# Patient Record
Sex: FEMALE | Race: BLACK OR AFRICAN AMERICAN | URBAN - METROPOLITAN AREA
[De-identification: names, ages, dates, MRNs, and addresses within clinical notes are randomized per-mention and may not be internally consistent; named-entity substitution may affect disease eponyms.]

---

## 2024-08-05 ENCOUNTER — OFFICE VISIT (OUTPATIENT)
Dept: ENDOCRINOLOGY | Age: 44
End: 2024-08-05
Payer: COMMERCIAL

## 2024-08-05 VITALS — DIASTOLIC BLOOD PRESSURE: 90 MMHG | SYSTOLIC BLOOD PRESSURE: 145 MMHG | WEIGHT: 243 LBS

## 2024-08-05 DIAGNOSIS — I49.9 IRREGULAR HEART RHYTHM: ICD-10-CM

## 2024-08-05 DIAGNOSIS — E05.90 HYPERTHYROIDISM: ICD-10-CM

## 2024-08-05 DIAGNOSIS — E05.00 THYROTOXICOSIS DUE TO GRAVES' DISEASE: Primary | ICD-10-CM

## 2024-08-05 PROBLEM — I10 ESSENTIAL HYPERTENSION: Status: ACTIVE | Noted: 2024-07-03

## 2024-08-05 PROBLEM — E28.2 PCOS (POLYCYSTIC OVARIAN SYNDROME): Status: ACTIVE | Noted: 2024-07-03

## 2024-08-05 PROCEDURE — 3077F SYST BP >= 140 MM HG: CPT | Performed by: STUDENT IN AN ORGANIZED HEALTH CARE EDUCATION/TRAINING PROGRAM

## 2024-08-05 PROCEDURE — 99205 OFFICE O/P NEW HI 60 MIN: CPT | Performed by: STUDENT IN AN ORGANIZED HEALTH CARE EDUCATION/TRAINING PROGRAM

## 2024-08-05 PROCEDURE — 3080F DIAST BP >= 90 MM HG: CPT | Performed by: STUDENT IN AN ORGANIZED HEALTH CARE EDUCATION/TRAINING PROGRAM

## 2024-08-05 RX ORDER — PROGESTERONE 200 MG/1
CAPSULE ORAL
COMMUNITY

## 2024-08-05 RX ORDER — AZITHROMYCIN 250 MG/1
TABLET, FILM COATED ORAL
COMMUNITY
Start: 2024-06-25

## 2024-08-05 RX ORDER — METHIMAZOLE 10 MG/1
10 TABLET ORAL 2 TIMES DAILY
COMMUNITY
Start: 2024-07-15

## 2024-08-05 RX ORDER — ATENOLOL 25 MG/1
25 TABLET ORAL
COMMUNITY
Start: 2024-07-15

## 2024-08-05 ASSESSMENT — ENCOUNTER SYMPTOMS
DIARRHEA: 0
TROUBLE SWALLOWING: 0
EYE REDNESS: 0
CHEST TIGHTNESS: 0
SHORTNESS OF BREATH: 0
SORE THROAT: 0
EYE PAIN: 0
NAUSEA: 0
CONSTIPATION: 0
ABDOMINAL PAIN: 0
VOICE CHANGE: 0
EYE ITCHING: 0

## 2024-08-05 NOTE — PROGRESS NOTES
progesterone (PROMETRIUM) 200 MG CAPS capsule TAKE ONE TABLET BY MOUTH EVERY EVENING FOR 12 DAYS of THE MONTH      methIMAzole (TAPAZOLE) 10 MG tablet Take 1 tablet by mouth 2 times daily       No current facility-administered medications for this visit.        Allergies    Allergies   Allergen Reactions    Penicillins Other (See Comments)        BP (!) 145/90 (Site: Left Upper Arm, Position: Sitting)   Wt 110.2 kg (243 lb)     BP Readings from Last 3 Encounters:   08/05/24 (!) 145/90       Wt Readings from Last 3 Encounters:   08/05/24 110.2 kg (243 lb)       Physical Exam  Constitutional:       General: She is not in acute distress.     Appearance: Normal appearance. She is not diaphoretic.   HENT:      Head: Normocephalic and atraumatic.      Mouth/Throat:      Mouth: Mucous membranes are moist.   Eyes:      Extraocular Movements: Extraocular movements intact.      Conjunctiva/sclera: Conjunctivae normal.      Pupils: Pupils are equal, round, and reactive to light.      Comments: No proptosis, lid lag or stare   Neck:      Thyroid: No thyroid mass, thyromegaly or thyroid tenderness.   Cardiovascular:      Rate and Rhythm: Normal rate. Rhythm irregular.      Heart sounds: No murmur heard.     No friction rub. No gallop.   Pulmonary:      Effort: Pulmonary effort is normal.      Breath sounds: Normal breath sounds.   Abdominal:      General: Abdomen is flat.      Palpations: Abdomen is soft.   Musculoskeletal:         General: No swelling or deformity.      Cervical back: Normal range of motion and neck supple. No tenderness.   Lymphadenopathy:      Cervical: No cervical adenopathy.   Skin:     General: Skin is warm and dry.      Findings: No rash.   Neurological:      General: No focal deficit present.      Mental Status: She is alert and oriented to person, place, and time. Mental status is at baseline.      Motor: No weakness.      Coordination: Coordination normal.      Gait: Gait normal.      Deep Tendon

## 2024-08-05 NOTE — ASSESSMENT & PLAN NOTE
Uncontrolled Graves disease, +TPO, TSI and TRAB  Started on Methimazole 10 mg daily 3 weeks ago.   Started Ateonolol 25 mg daily 1 month ago. SBP is 130s at home with HR in the 70s at rest. HR is 90 in office today with /90.  Recheck labs in 2 weeks, Lab de per patient request.  Titrate MMI based on labs.  Clinically euthyroid at present.  Discussed goals of treatment and potential side effects to medications.

## 2024-08-19 DIAGNOSIS — E05.90 HYPERTHYROIDISM: ICD-10-CM

## 2024-08-20 ENCOUNTER — TELEPHONE (OUTPATIENT)
Dept: ENDOCRINOLOGY | Age: 44
End: 2024-08-20

## 2024-08-20 DIAGNOSIS — E05.00 THYROTOXICOSIS DUE TO GRAVES' DISEASE: Primary | ICD-10-CM

## 2024-08-20 RX ORDER — METHIMAZOLE 10 MG/1
20 TABLET ORAL 2 TIMES DAILY
Qty: 180 TABLET | Refills: 4 | Status: SHIPPED | OUTPATIENT
Start: 2024-08-20 | End: 2025-08-15

## 2024-08-20 NOTE — TELEPHONE ENCOUNTER
Please call to let the patient know that I reviewed labs that were completed on 8/15/2024.  Only mild improvement in her thyroid function. Please confirm that she is compliant with methimazole 10 mg 3 times daily.  If she is taking as prescribed then please advise that she increase methimazole to 20 mg twice daily (2 tabs twice daily).  If she is not currently taking her methimazole or taking it appropriately, then please let me know.  According to her recent primary care note, she was not taking the medication.  If she is having concerns or questions, then I am happy to see her back sooner if that is helpful.

## 2024-08-22 ENCOUNTER — PATIENT MESSAGE (OUTPATIENT)
Dept: ENDOCRINOLOGY | Age: 44
End: 2024-08-22

## 2024-08-23 NOTE — TELEPHONE ENCOUNTER
Spoke to the Pt, she is so confused and wants to know what she needs to take. She was told 20 mg now to 40 mg daily. Please advise how you want her to take the methimazole?

## 2024-09-08 DIAGNOSIS — E05.90 HYPERTHYROIDISM: ICD-10-CM

## 2024-09-19 LAB
T3FREE SERPL-MCNC: 4.1 PG/ML (ref 2–4.4)
T4 FREE SERPL-MCNC: 1.58 NG/DL (ref 0.82–1.77)
TSH SERPL DL<=0.005 MIU/L-ACNC: <0.005 UIU/ML (ref 0.45–4.5)

## 2024-09-23 DIAGNOSIS — E05.00 THYROTOXICOSIS DUE TO GRAVES' DISEASE: ICD-10-CM

## 2024-09-23 RX ORDER — METHIMAZOLE 10 MG/1
20 TABLET ORAL DAILY
Qty: 120 TABLET | Refills: 4 | Status: SHIPPED | OUTPATIENT
Start: 2024-09-23 | End: 2025-09-18

## 2024-11-01 ENCOUNTER — OFFICE VISIT (OUTPATIENT)
Dept: ENDOCRINOLOGY | Age: 44
End: 2024-11-01
Payer: COMMERCIAL

## 2024-11-01 VITALS — WEIGHT: 246 LBS | DIASTOLIC BLOOD PRESSURE: 80 MMHG | SYSTOLIC BLOOD PRESSURE: 122 MMHG

## 2024-11-01 DIAGNOSIS — E05.00 THYROTOXICOSIS DUE TO GRAVES' DISEASE: Primary | ICD-10-CM

## 2024-11-01 PROCEDURE — 99214 OFFICE O/P EST MOD 30 MIN: CPT | Performed by: STUDENT IN AN ORGANIZED HEALTH CARE EDUCATION/TRAINING PROGRAM

## 2024-11-01 PROCEDURE — 3079F DIAST BP 80-89 MM HG: CPT | Performed by: STUDENT IN AN ORGANIZED HEALTH CARE EDUCATION/TRAINING PROGRAM

## 2024-11-01 PROCEDURE — 3074F SYST BP LT 130 MM HG: CPT | Performed by: STUDENT IN AN ORGANIZED HEALTH CARE EDUCATION/TRAINING PROGRAM

## 2024-11-01 RX ORDER — CHLORTHALIDONE 25 MG/1
25 TABLET ORAL EVERY MORNING
COMMUNITY
Start: 2024-10-25

## 2024-11-01 RX ORDER — METHIMAZOLE 5 MG/1
15 TABLET ORAL DAILY
Qty: 120 TABLET | Refills: 4 | Status: SHIPPED | OUTPATIENT
Start: 2024-11-01 | End: 2025-10-27

## 2024-11-01 ASSESSMENT — ENCOUNTER SYMPTOMS
VOICE CHANGE: 0
EYE REDNESS: 0
SHORTNESS OF BREATH: 0
ABDOMINAL PAIN: 0
DIARRHEA: 0
TROUBLE SWALLOWING: 0
EYE ITCHING: 0
CHEST TIGHTNESS: 0
NAUSEA: 0
SORE THROAT: 0
EYE PAIN: 0
CONSTIPATION: 0

## 2024-11-01 NOTE — PROGRESS NOTES
DO Luis Carlos Peck [facility]  [address], [address]  [place] [address]        Yarelis Fuentes is a 43 y.o. female who presents for follow up, evaluation and management of Hyperthyroidism.  LOV/est care 2024    ASSESSMENT AND PLAN:    1. Thyrotoxicosis due to Graves' disease  Overview:  Uncontrolled Graves disease, +TPO, TSI and TRAB  Methimazole was increased from 10 mg to 20 mg on 2024. Labs 2024 and 10/2024 with FT3 and FT4 in normal range. TSH remains undetectable.  Ateonolol stopped by PCP, noted increase in palpitations. Will discuss with PCP. Defer BP management.  Discussed goals of treatment and potential side effects to medications.  Assessment & Plan:  Decrease methimazole to 15 mg daily and recheck labs in 6 weeks.  Titrate MMI based on labs.  Orders:  -     TSH; Future  -     T4, Free; Future  -     T3, Free; Future  -     methIMAzole (TAPAZOLE) 5 MG tablet; Take 3 tablets by mouth daily, Disp-120 tablet, R-4Normal       History of Present Illness:  HYPERTHYROIDISM    Symptoms: See review of systems below  (+) elevated heart rate  (-) Eye symptoms, tremor, mood changes, sleep changes, GI  2018 she intentionally lost about 50 lbs. But she has been stable at her current weight.    Family History: The patient does have a family history of thyroid disorders.  Only aunt with thyroid cancer, hypothyroidism after thyroidectomy.    Medications:  Started Atenolol 24, initially 50 mg then decreased to 25 mg daily.   She started Methimazole 10 mg daily on 7/15/2024. She reports nausea with taking the medication, otherwise tolerating.   Counseled on side effects.  Increase to methimazole 20 mg daily 2024.  Based on labs today we will decrease methimazole dose to 15 mg daily.    Prior imagin Head and Neck US:  Thyromegaly. There is diffusely heterogenous echogenicity of the thyroid but no focal abnormality is seen.     Prior laboratory evaluation:  2024 TSH

## 2025-02-07 ENCOUNTER — OFFICE VISIT (OUTPATIENT)
Dept: ENDOCRINOLOGY | Age: 45
End: 2025-02-07
Payer: COMMERCIAL

## 2025-02-07 VITALS
DIASTOLIC BLOOD PRESSURE: 84 MMHG | HEIGHT: 70 IN | OXYGEN SATURATION: 98 % | SYSTOLIC BLOOD PRESSURE: 126 MMHG | BODY MASS INDEX: 37.08 KG/M2 | HEART RATE: 84 BPM | WEIGHT: 259 LBS

## 2025-02-07 DIAGNOSIS — E05.00 THYROTOXICOSIS DUE TO GRAVES' DISEASE: Primary | ICD-10-CM

## 2025-02-07 DIAGNOSIS — E05.90 HYPERTHYROIDISM: ICD-10-CM

## 2025-02-07 LAB
ALBUMIN SERPL-MCNC: 3.7 G/DL (ref 3.5–5)
ALBUMIN/GLOB SERPL: 0.8 (ref 1–1.9)
ALP SERPL-CCNC: 74 U/L (ref 35–104)
ALT SERPL-CCNC: 12 U/L (ref 8–45)
ANION GAP SERPL CALC-SCNC: 12 MMOL/L (ref 7–16)
AST SERPL-CCNC: 21 U/L (ref 15–37)
BILIRUB SERPL-MCNC: 0.9 MG/DL (ref 0–1.2)
BUN SERPL-MCNC: 14 MG/DL (ref 6–23)
CALCIUM SERPL-MCNC: 8.9 MG/DL (ref 8.8–10.2)
CHLORIDE SERPL-SCNC: 103 MMOL/L (ref 98–107)
CO2 SERPL-SCNC: 23 MMOL/L (ref 20–29)
CREAT SERPL-MCNC: 0.72 MG/DL (ref 0.6–1.1)
GLOBULIN SER CALC-MCNC: 4.5 G/DL (ref 2.3–3.5)
GLUCOSE SERPL-MCNC: 85 MG/DL (ref 70–99)
POTASSIUM SERPL-SCNC: 3.7 MMOL/L (ref 3.5–5.1)
PROT SERPL-MCNC: 8.2 G/DL (ref 6.3–8.2)
SODIUM SERPL-SCNC: 138 MMOL/L (ref 136–145)
T4 FREE SERPL-MCNC: 0.7 NG/DL (ref 0.9–1.7)
TSH, 3RD GENERATION: 1.69 UIU/ML (ref 0.27–4.2)

## 2025-02-07 PROCEDURE — 99214 OFFICE O/P EST MOD 30 MIN: CPT | Performed by: STUDENT IN AN ORGANIZED HEALTH CARE EDUCATION/TRAINING PROGRAM

## 2025-02-07 PROCEDURE — 3079F DIAST BP 80-89 MM HG: CPT | Performed by: STUDENT IN AN ORGANIZED HEALTH CARE EDUCATION/TRAINING PROGRAM

## 2025-02-07 PROCEDURE — 3074F SYST BP LT 130 MM HG: CPT | Performed by: STUDENT IN AN ORGANIZED HEALTH CARE EDUCATION/TRAINING PROGRAM

## 2025-02-07 RX ORDER — METHIMAZOLE 10 MG/1
10 TABLET ORAL DAILY
Qty: 90 TABLET | Refills: 1 | Status: SHIPPED | OUTPATIENT
Start: 2025-02-07 | End: 2026-02-02

## 2025-02-07 ASSESSMENT — ENCOUNTER SYMPTOMS
ABDOMINAL PAIN: 0
EYE REDNESS: 0
VOICE CHANGE: 0
SHORTNESS OF BREATH: 0
TROUBLE SWALLOWING: 0
DIARRHEA: 0
SORE THROAT: 0
EYE PAIN: 0
NAUSEA: 0
CONSTIPATION: 0
EYE ITCHING: 0
CHEST TIGHTNESS: 0

## 2025-02-07 NOTE — PROGRESS NOTES
DO Luis Carlos Peck [facility]  [address], [address]  [place] [address]        Yarelis Fuentes is a 44 y.o. female who presents for follow up, evaluation and management of Hyperthyroidism.  LOV/est care 8/5/2024  LOV 11/1/2024    NOTICE FOR THE PATIENT: This clinical note is not designed to be interpreted by patients.  We do not recommend reading it unless you have medical training. These notes may contain candid and (unintentionally) offensive descriptions, which are sometimes required for accurate documentation. If you would like more information about your healthcare, please obtain it directly by myself or my staff/colleagues - never solely from the notes. Thank you for your understanding and cooperation.    ASSESSMENT AND PLAN:    1. Thyrotoxicosis due to Graves' disease  Overview:  Uncontrolled Graves disease, +TPO, TSI and TRAB  Methimazole was increased from 10 mg to 20 mg on 8/2024. Labs 9/2024 and 10/2024 with FT3 and FT4 in normal range. TSH remains undetectable.  Decreased Methimazole to 15 mg daily 11/1/2024.  Assessment & Plan:  Reviewed labs from 12/12/2024, plan to get labs today, anticipate decreasing Methimazole dose.  Orders:  -     methIMAzole (TAPAZOLE) 10 MG tablet; Take 1 tablet by mouth daily, Disp-90 tablet, R-1Normal  2. Hyperthyroidism  -     T3, Free; Future  -     T4, Free; Future  -     TSH; Future  -     Comprehensive Metabolic Panel; Future  -     T3, Free; Future  -     T4, Free; Future  -     TSH; Future  -     methIMAzole (TAPAZOLE) 10 MG tablet; Take 1 tablet by mouth daily, Disp-90 tablet, R-1Normal       Follow-up and Dispositions    Return in about 3 months (around 5/7/2025) for Hyperthyroidism.           History of Present Illness:  HYPERTHYROIDISM    Symptoms: See review of systems below  (+) elevated heart rate  (-) Eye symptoms, tremor, mood changes, sleep changes, GI  2018 she intentionally lost about 50 lbs. But she has been stable at her current

## 2025-02-09 LAB — T3FREE SERPL-MCNC: 2.2 PG/ML (ref 2–4.4)

## 2025-03-06 DIAGNOSIS — E05.90 HYPERTHYROIDISM: ICD-10-CM

## 2025-03-06 DIAGNOSIS — E05.00 THYROTOXICOSIS DUE TO GRAVES' DISEASE: ICD-10-CM

## 2025-03-06 DIAGNOSIS — E05.90 HYPERTHYROIDISM: Primary | ICD-10-CM

## 2025-03-06 RX ORDER — METHIMAZOLE 5 MG/1
5 TABLET ORAL DAILY
Qty: 30 TABLET | Refills: 1 | Status: SHIPPED | OUTPATIENT
Start: 2025-03-06 | End: 2026-03-01

## 2025-05-14 DIAGNOSIS — E05.90 HYPERTHYROIDISM: ICD-10-CM

## 2025-05-14 DIAGNOSIS — E05.00 THYROTOXICOSIS DUE TO GRAVES' DISEASE: ICD-10-CM

## 2025-05-14 LAB
T4 FREE SERPL-MCNC: 0.7 NG/DL (ref 0.9–1.7)
TSH, 3RD GENERATION: 3.88 UIU/ML (ref 0.27–4.2)

## 2025-05-16 ENCOUNTER — OFFICE VISIT (OUTPATIENT)
Dept: ENDOCRINOLOGY | Age: 45
End: 2025-05-16
Payer: COMMERCIAL

## 2025-05-16 VITALS
DIASTOLIC BLOOD PRESSURE: 74 MMHG | HEART RATE: 82 BPM | SYSTOLIC BLOOD PRESSURE: 120 MMHG | HEIGHT: 70 IN | OXYGEN SATURATION: 98 % | BODY MASS INDEX: 37.31 KG/M2 | WEIGHT: 260.6 LBS

## 2025-05-16 DIAGNOSIS — E05.90 HYPERTHYROIDISM: Primary | ICD-10-CM

## 2025-05-16 LAB — T3FREE SERPL-MCNC: 2.1 PG/ML (ref 2–4.4)

## 2025-05-16 PROCEDURE — 3078F DIAST BP <80 MM HG: CPT | Performed by: STUDENT IN AN ORGANIZED HEALTH CARE EDUCATION/TRAINING PROGRAM

## 2025-05-16 PROCEDURE — 99214 OFFICE O/P EST MOD 30 MIN: CPT | Performed by: STUDENT IN AN ORGANIZED HEALTH CARE EDUCATION/TRAINING PROGRAM

## 2025-05-16 PROCEDURE — 3074F SYST BP LT 130 MM HG: CPT | Performed by: STUDENT IN AN ORGANIZED HEALTH CARE EDUCATION/TRAINING PROGRAM

## 2025-05-16 RX ORDER — AMLODIPINE BESYLATE 5 MG/1
5 TABLET ORAL DAILY
COMMUNITY
Start: 2025-04-30

## 2025-05-16 RX ORDER — LEVOCETIRIZINE DIHYDROCHLORIDE 5 MG/1
5 TABLET, FILM COATED ORAL
COMMUNITY
Start: 2025-04-01

## 2025-05-16 ASSESSMENT — ENCOUNTER SYMPTOMS
VOICE CHANGE: 0
NAUSEA: 0
TROUBLE SWALLOWING: 0
ABDOMINAL PAIN: 0
EYE REDNESS: 0
CONSTIPATION: 0
SHORTNESS OF BREATH: 0
DIARRHEA: 0
EYE ITCHING: 0
EYE PAIN: 0
SORE THROAT: 0
CHEST TIGHTNESS: 0

## 2025-05-16 NOTE — PROGRESS NOTES
Gastrointestinal:  Negative for abdominal pain, constipation, diarrhea and nausea.   Endocrine: Negative for cold intolerance and heat intolerance.   Musculoskeletal:  Negative for joint swelling, myalgias, neck pain and neck stiffness.   Skin: Negative.    Neurological:  Negative for tremors, weakness and headaches.   Psychiatric/Behavioral:  Negative for decreased concentration, dysphoric mood and sleep disturbance. The patient is not nervous/anxious and is not hyperactive.        Past Social History: reviewed in chart    Family History   Problem Relation Age of Onset    Thyroid Cancer Maternal Aunt        Medical/Surgical/Social/Family History:  No past medical history on file.    No past surgical history on file.    Medications  Reviewed in chart  Current Outpatient Medications   Medication Sig Dispense Refill    amLODIPine (NORVASC) 5 MG tablet Take 1 tablet by mouth daily      levocetirizine (XYZAL) 5 MG tablet Take 1 tablet by mouth      methIMAzole (TAPAZOLE) 5 MG tablet Take 1 tablet by mouth daily 30 tablet 1    progesterone (PROMETRIUM) 200 MG CAPS capsule TAKE ONE TABLET BY MOUTH EVERY EVENING FOR 12 DAYS of THE MONTH       No current facility-administered medications for this visit.        Allergies    Allergies   Allergen Reactions    Penicillins Other (See Comments)        /74 (BP Site: Left Lower Arm, Patient Position: Sitting, BP Cuff Size: Large Adult)   Pulse 82   Ht 1.778 m (5' 10\")   Wt 118.2 kg (260 lb 9.6 oz)   SpO2 98%   BMI 37.39 kg/m²     BP Readings from Last 3 Encounters:   05/16/25 120/74   02/07/25 126/84   11/01/24 122/80       Wt Readings from Last 3 Encounters:   05/16/25 118.2 kg (260 lb 9.6 oz)   02/07/25 117.5 kg (259 lb)   11/01/24 111.6 kg (246 lb)       Physical Exam  Constitutional:       General: She is not in acute distress.     Appearance: Normal appearance. She is not diaphoretic.   HENT:      Head: Normocephalic and atraumatic.      Mouth/Throat:      Mouth:

## 2025-06-12 DIAGNOSIS — E05.90 HYPERTHYROIDISM: ICD-10-CM

## 2025-06-12 LAB
T4 FREE SERPL-MCNC: 1.4 NG/DL (ref 0.9–1.7)
TSH, 3RD GENERATION: 0.03 UIU/ML (ref 0.27–4.2)

## 2025-06-13 LAB — T3FREE SERPL-MCNC: 4.5 PG/ML (ref 2–4.4)

## 2025-06-15 ENCOUNTER — RESULTS FOLLOW-UP (OUTPATIENT)
Dept: ENDOCRINOLOGY | Age: 45
End: 2025-06-15

## 2025-06-20 ENCOUNTER — OFFICE VISIT (OUTPATIENT)
Dept: ENDOCRINOLOGY | Age: 45
End: 2025-06-20
Payer: COMMERCIAL

## 2025-06-20 VITALS
HEART RATE: 84 BPM | DIASTOLIC BLOOD PRESSURE: 76 MMHG | OXYGEN SATURATION: 100 % | SYSTOLIC BLOOD PRESSURE: 138 MMHG | WEIGHT: 253.8 LBS | BODY MASS INDEX: 36.33 KG/M2 | HEIGHT: 70 IN

## 2025-06-20 DIAGNOSIS — E05.90 HYPERTHYROIDISM: Primary | ICD-10-CM

## 2025-06-20 PROCEDURE — 3078F DIAST BP <80 MM HG: CPT | Performed by: STUDENT IN AN ORGANIZED HEALTH CARE EDUCATION/TRAINING PROGRAM

## 2025-06-20 PROCEDURE — 99214 OFFICE O/P EST MOD 30 MIN: CPT | Performed by: STUDENT IN AN ORGANIZED HEALTH CARE EDUCATION/TRAINING PROGRAM

## 2025-06-20 PROCEDURE — 3075F SYST BP GE 130 - 139MM HG: CPT | Performed by: STUDENT IN AN ORGANIZED HEALTH CARE EDUCATION/TRAINING PROGRAM

## 2025-06-20 NOTE — PROGRESS NOTES
DO Luis Carlos Peck [facility]  [address], [address]  [place] [address]        Yarelis Fuentes is a 44 y.o. female who presents for follow up, evaluation and management of Grave's disease and Hyperthyroidism.  Est care 8/5/2024  LOV 5/16/2025    NOTICE FOR THE PATIENT: This clinical note is not designed to be interpreted by patients.  We do not recommend reading it unless you have medical training. These notes may contain candid and (unintentionally) offensive descriptions, which are sometimes required for accurate documentation. If you would like more information about your healthcare, please obtain it directly by myself or my staff/colleagues - never solely from the notes. Thank you for your understanding and cooperation.    Assessment & Plan  Hyperthyroidism  Graves' disease  Thyroid function tests indicate return of hyperthyroidism.  Lost 7 pounds since last appointment. No anxiety, sleep, digestion, or bowel issues since stopping medication.  /76, Pulse 84.  Diagnostic plan: TSH 0.034, Free T4 1.4, Free T3 4.5.  Treatment plan: Resume methimazole 2.5 mg daily. (6/15/2025)    Follow-up: Recheck labs in 5 weeks. Follow-up in 3 months.    Follow-up and Dispositions    Return in about 3 months (around 9/22/2025) for Hyperthyroidism.       Orders Placed This Encounter    T3, Free     Standing Status:   Future     Expected Date:   7/28/2025     Expiration Date:   6/20/2026    T4, Free     Standing Status:   Future     Expected Date:   7/28/2025     Expiration Date:   6/20/2026    TSH     Standing Status:   Future     Expected Date:   7/28/2025     Expiration Date:   6/20/2026    T3, Free     Standing Status:   Future     Expected Date:   9/17/2025     Expiration Date:   6/20/2026    T4, Free     Standing Status:   Future     Expected Date:   9/17/2025     Expiration Date:   6/20/2026    TSH     Standing Status:   Future     Expected Date:   9/17/2025     Expiration Date:   6/20/2026

## 2025-07-24 DIAGNOSIS — E05.90 HYPERTHYROIDISM: ICD-10-CM

## 2025-07-24 LAB
T4 FREE SERPL-MCNC: 1.2 NG/DL (ref 0.9–1.7)
TSH, 3RD GENERATION: 0.02 UIU/ML (ref 0.27–4.2)

## 2025-07-25 LAB — T3FREE SERPL-MCNC: 3.5 PG/ML (ref 2–4.4)
